# Patient Record
Sex: FEMALE | Race: WHITE | URBAN - METROPOLITAN AREA
[De-identification: names, ages, dates, MRNs, and addresses within clinical notes are randomized per-mention and may not be internally consistent; named-entity substitution may affect disease eponyms.]

---

## 2022-07-25 ENCOUNTER — OFFICE VISIT (OUTPATIENT)
Dept: URBAN - METROPOLITAN AREA CLINIC 85 | Facility: CLINIC | Age: 62
End: 2022-07-25
Payer: COMMERCIAL

## 2022-07-25 DIAGNOSIS — H25.13 AGE-RELATED NUCLEAR CATARACT, BILATERAL: ICD-10-CM

## 2022-07-25 DIAGNOSIS — H43.391 OTHER VITREOUS OPACITIES, RIGHT EYE: Primary | ICD-10-CM

## 2022-07-25 PROCEDURE — 92201 OPSCPY EXTND RTA DRAW UNI/BI: CPT | Performed by: OPHTHALMOLOGY

## 2022-07-25 PROCEDURE — 92004 COMPRE OPH EXAM NEW PT 1/>: CPT | Performed by: OPHTHALMOLOGY

## 2022-07-25 ASSESSMENT — KERATOMETRY
OS: 44.75
OD: 44.88

## 2022-07-25 ASSESSMENT — INTRAOCULAR PRESSURE
OS: 18
OD: 18

## 2022-07-25 NOTE — IMPRESSION/PLAN
Impression: Other vitreous opacities, right eye: H43.391. Plan: Reviewed with patient RD precautions, Patient will contact the office immediately if any changes noted in vision including flashes, increased floaters , veil in vision, or vision loss.   Recommend restricting vigorous activity, such as working out with a lot of jumping, karate, etc.

## 2022-08-22 ENCOUNTER — OFFICE VISIT (OUTPATIENT)
Dept: URBAN - METROPOLITAN AREA CLINIC 85 | Facility: CLINIC | Age: 62
End: 2022-08-22
Payer: COMMERCIAL

## 2022-08-22 DIAGNOSIS — H43.811 VITREOUS DEGENERATION, RIGHT EYE: Primary | ICD-10-CM

## 2022-08-22 PROCEDURE — 99214 OFFICE O/P EST MOD 30 MIN: CPT | Performed by: OPHTHALMOLOGY

## 2022-08-22 PROCEDURE — 92201 OPSCPY EXTND RTA DRAW UNI/BI: CPT | Performed by: OPHTHALMOLOGY

## 2022-08-22 ASSESSMENT — INTRAOCULAR PRESSURE
OD: 17
OS: 17